# Patient Record
Sex: MALE | Race: WHITE
[De-identification: names, ages, dates, MRNs, and addresses within clinical notes are randomized per-mention and may not be internally consistent; named-entity substitution may affect disease eponyms.]

---

## 2021-11-19 ENCOUNTER — DASHBOARD ENCOUNTERS (OUTPATIENT)
Age: 31
End: 2021-11-19

## 2021-11-19 ENCOUNTER — WEB ENCOUNTER (OUTPATIENT)
Dept: URBAN - METROPOLITAN AREA CLINIC 113 | Facility: CLINIC | Age: 31
End: 2021-11-19

## 2021-11-19 ENCOUNTER — OFFICE VISIT (OUTPATIENT)
Dept: URBAN - METROPOLITAN AREA CLINIC 113 | Facility: CLINIC | Age: 31
End: 2021-11-19
Payer: COMMERCIAL

## 2021-11-19 VITALS
SYSTOLIC BLOOD PRESSURE: 128 MMHG | BODY MASS INDEX: 24.38 KG/M2 | TEMPERATURE: 98.2 F | HEIGHT: 72 IN | DIASTOLIC BLOOD PRESSURE: 82 MMHG | HEART RATE: 87 BPM | WEIGHT: 180 LBS

## 2021-11-19 DIAGNOSIS — K64.4 EXTERNAL HEMORRHOID: ICD-10-CM

## 2021-11-19 DIAGNOSIS — K62.89 PROCTALGIA: ICD-10-CM

## 2021-11-19 PROCEDURE — 99204 OFFICE O/P NEW MOD 45 MIN: CPT | Performed by: NURSE PRACTITIONER

## 2021-11-19 RX ORDER — HYDROCORTISONE 25 MG/G
1 APPLICATION CREAM TOPICAL TWICE A DAY
Qty: 28 GRAMS | Refills: 1 | OUTPATIENT
Start: 2021-11-19 | End: 2022-01-18

## 2021-11-19 NOTE — HPI-TODAY'S VISIT:
30yo male presenting for evaluation of hemorrhoids.  Within the last few weeks, he has experienced an exacerbation of rectal pain initially attributed to a thrombosed external hemorrhoid. He saw a colorectal surgery in [place] earlier this month and was recommended sitz baths and fiber. His symptoms are unchanged, and have been worsening over the last few days. He complains of sharp rectal pain but denies red blood per rectum. The symptoms are worsened with exercise. His bowel habits are regular and he denies constipation or diarrhea. He has increased his fiber intake. No abdominal pain, nausea or vomiting.